# Patient Record
Sex: FEMALE | Race: WHITE | Employment: OTHER | URBAN - METROPOLITAN AREA
[De-identification: names, ages, dates, MRNs, and addresses within clinical notes are randomized per-mention and may not be internally consistent; named-entity substitution may affect disease eponyms.]

---

## 2022-11-18 ENCOUNTER — ANESTHESIA (OUTPATIENT)
Dept: ENDOSCOPY | Age: 77
End: 2022-11-18
Payer: MEDICARE

## 2022-11-18 ENCOUNTER — ANESTHESIA EVENT (OUTPATIENT)
Dept: ENDOSCOPY | Age: 77
End: 2022-11-18
Payer: MEDICARE

## 2022-11-18 ENCOUNTER — HOSPITAL ENCOUNTER (OUTPATIENT)
Age: 77
Setting detail: OUTPATIENT SURGERY
Discharge: HOME | End: 2022-11-18
Attending: INTERNAL MEDICINE | Admitting: INTERNAL MEDICINE
Payer: MEDICARE

## 2022-11-18 VITALS
RESPIRATION RATE: 20 BRPM | DIASTOLIC BLOOD PRESSURE: 68 MMHG | HEIGHT: 63 IN | HEART RATE: 83 BPM | BODY MASS INDEX: 18.75 KG/M2 | OXYGEN SATURATION: 99 % | WEIGHT: 105.82 LBS | TEMPERATURE: 98.8 F | SYSTOLIC BLOOD PRESSURE: 130 MMHG

## 2022-11-18 LAB
H PYLORI FROM TISSUE: NEGATIVE
KIT LOT NO., HCLOLOT: NORMAL
NEGATIVE CONTROL: NEGATIVE
POSITIVE CONTROL: POSITIVE

## 2022-11-18 PROCEDURE — 74011000258 HC RX REV CODE- 258: Performed by: NURSE ANESTHETIST, CERTIFIED REGISTERED

## 2022-11-18 PROCEDURE — 74011000250 HC RX REV CODE- 250: Performed by: NURSE ANESTHETIST, CERTIFIED REGISTERED

## 2022-11-18 PROCEDURE — 76060000031 HC ANESTHESIA FIRST 0.5 HR: Performed by: INTERNAL MEDICINE

## 2022-11-18 PROCEDURE — 2709999900 HC NON-CHARGEABLE SUPPLY: Performed by: INTERNAL MEDICINE

## 2022-11-18 PROCEDURE — 76040000019: Performed by: INTERNAL MEDICINE

## 2022-11-18 PROCEDURE — 74011250636 HC RX REV CODE- 250/636: Performed by: NURSE ANESTHETIST, CERTIFIED REGISTERED

## 2022-11-18 PROCEDURE — 77030021593 HC FCPS BIOP ENDOSC BSC -A: Performed by: INTERNAL MEDICINE

## 2022-11-18 PROCEDURE — 88305 TISSUE EXAM BY PATHOLOGIST: CPT

## 2022-11-18 PROCEDURE — 87077 CULTURE AEROBIC IDENTIFY: CPT | Performed by: INTERNAL MEDICINE

## 2022-11-18 RX ORDER — ATROPINE SULFATE 0.1 MG/ML
0.5 INJECTION INTRAVENOUS
Status: DISCONTINUED | OUTPATIENT
Start: 2022-11-18 | End: 2022-11-18 | Stop reason: HOSPADM

## 2022-11-18 RX ORDER — LORAZEPAM 0.5 MG/1
0.5 TABLET ORAL
COMMUNITY

## 2022-11-18 RX ORDER — FENTANYL CITRATE 50 UG/ML
100 INJECTION, SOLUTION INTRAMUSCULAR; INTRAVENOUS
Status: DISCONTINUED | OUTPATIENT
Start: 2022-11-18 | End: 2022-11-18 | Stop reason: HOSPADM

## 2022-11-18 RX ORDER — LIDOCAINE HYDROCHLORIDE 20 MG/ML
INJECTION, SOLUTION EPIDURAL; INFILTRATION; INTRACAUDAL; PERINEURAL AS NEEDED
Status: DISCONTINUED | OUTPATIENT
Start: 2022-11-18 | End: 2022-11-18 | Stop reason: HOSPADM

## 2022-11-18 RX ORDER — EPINEPHRINE 0.1 MG/ML
1 INJECTION INTRACARDIAC; INTRAVENOUS
Status: DISCONTINUED | OUTPATIENT
Start: 2022-11-18 | End: 2022-11-18 | Stop reason: HOSPADM

## 2022-11-18 RX ORDER — FLUMAZENIL 0.1 MG/ML
0.2 INJECTION INTRAVENOUS
Status: DISCONTINUED | OUTPATIENT
Start: 2022-11-18 | End: 2022-11-18 | Stop reason: HOSPADM

## 2022-11-18 RX ORDER — MIDAZOLAM HYDROCHLORIDE 1 MG/ML
.25-5 INJECTION, SOLUTION INTRAMUSCULAR; INTRAVENOUS
Status: DISCONTINUED | OUTPATIENT
Start: 2022-11-18 | End: 2022-11-18 | Stop reason: HOSPADM

## 2022-11-18 RX ORDER — DEXTROMETHORPHAN/PSEUDOEPHED 2.5-7.5/.8
1.2 DROPS ORAL
Status: DISCONTINUED | OUTPATIENT
Start: 2022-11-18 | End: 2022-11-18 | Stop reason: HOSPADM

## 2022-11-18 RX ORDER — NALOXONE HYDROCHLORIDE 0.4 MG/ML
0.4 INJECTION, SOLUTION INTRAMUSCULAR; INTRAVENOUS; SUBCUTANEOUS
Status: DISCONTINUED | OUTPATIENT
Start: 2022-11-18 | End: 2022-11-18 | Stop reason: HOSPADM

## 2022-11-18 RX ORDER — SODIUM CHLORIDE 9 MG/ML
50 INJECTION, SOLUTION INTRAVENOUS CONTINUOUS
Status: DISCONTINUED | OUTPATIENT
Start: 2022-11-18 | End: 2022-11-18 | Stop reason: HOSPADM

## 2022-11-18 RX ORDER — PROPOFOL 10 MG/ML
INJECTION, EMULSION INTRAVENOUS
Status: DISCONTINUED | OUTPATIENT
Start: 2022-11-18 | End: 2022-11-18 | Stop reason: HOSPADM

## 2022-11-18 RX ORDER — SODIUM CHLORIDE 9 MG/ML
INJECTION, SOLUTION INTRAVENOUS
Status: DISCONTINUED | OUTPATIENT
Start: 2022-11-18 | End: 2022-11-18 | Stop reason: HOSPADM

## 2022-11-18 RX ADMIN — PROPOFOL 100 MCG/KG/MIN: 10 INJECTION, EMULSION INTRAVENOUS at 12:40

## 2022-11-18 RX ADMIN — SODIUM CHLORIDE: 9 INJECTION, SOLUTION INTRAVENOUS at 12:34

## 2022-11-18 RX ADMIN — LIDOCAINE HYDROCHLORIDE 40 MG: 20 INJECTION, SOLUTION EPIDURAL; INFILTRATION; INTRACAUDAL; PERINEURAL at 12:40

## 2022-11-18 NOTE — PERIOP NOTES
1240  Timeout performed. Anesthesia staff at patient's bedside administering anesthesia and monitoring patients vital signs throughout procedure. See anesthesia note. Post procedure, report received from Zuleyka SAUCEDO. 36  Endoscope was pre-cleaned at bedside immediately following procedure by endo Ginny mabry. 1305  Patient tolerated procedure. Abdomen soft and patient arousable and voices no complaints. Patient transported to endoscopy recovery area. Report given to post procedure RNKimber.

## 2022-11-18 NOTE — PROGRESS NOTES
Endoscopy discharge instructions have been reviewed and given to patient. The patient verbalized understanding and acceptance of instructions. Dr. Kristin Teixeira discussed with spouse procedure findings and next steps.

## 2022-11-18 NOTE — PROCEDURES
Italo Wang M.D. 2022    Esophagogastroduodenoscopy (EGD) Colonoscopy Procedure Note  Sherman Gloria  : 1945  [address] [place] [address] Record Number: [id number]      Indications:    Diarrhea, abnormal weight loss  Referring Physician:  Royer Mccarthy MD  Anesthesia/Sedation: see nursing notes  Endoscopist:  Dr. Madelyn King  Assistants: None  Permit:  The indications, risks, benefits and alternatives were reviewed with the patient or their decision maker who was provided an opportunity to ask questions and all questions were answered. The specific risks of esophagogastroduodenoscopy with conscious sedation were reviewed, including but not limited to anesthetic complication, bleeding, adverse drug reaction, missed lesion, infection, IV site reactions, and intestinal perforation which would lead to the need for surgical repair. Alternatives to EGD including radiographic imaging, observation without testing, or laboratory testing were reviewed as well as the limitations of those alternatives discussed. After considering the options and having all their questions answered, the patient or their decision maker provided both verbal and written consent to proceed. Procedure in Detail:  After obtaining informed consent, positioning of the patient in the left lateral decubitus position, and conduction of a pre-procedure pause or \"time out\" the endoscope was introduced into the mouth and advanced to the duodenum. A careful inspection was made, and findings or interventions are described below.     Findings:   Esophagus:normal  Stomach: Shallow prepyloric erosions; stomach otherwise unremarkable  Duodenum/jejunum:  Unremarkable through the third portion    Complications/estimated blood loss: none    Therapies:  biopsy of stomach antrum    Specimens:  Biopsies    Implants: None           Endoscopist:   Kirit Milan  Assistants: None  Complications:  None  Estimate Blood Loss:  None    Colonoscopy is to follow    Permit:  The indications, risks, benefits and alternatives were reviewed with the patient or their decision maker who was provided an opportunity to ask questions and all questions were answered. The specific risks of colonoscopy with conscious sedation were reviewed, including but not limited to anesthetic complication, bleeding, adverse drug reaction, missed lesion, infection, IV site reactions, and intestinal perforation which would lead to the need for surgical repair. Alternatives to colonoscopy including radiographic imaging, observation without testing, or laboratory testing were reviewed including the limitations of those alternatives. After considering the options and having all their questions answered, the patient or their decision maker provided both verbal and written consent to proceed. Procedure in Detail:  After obtaining informed consent, positioning of the patient in the left lateral decubitus position, and conduction of a pre-procedure pause or \"time out\" the endoscope was introduced into the anus and advanced to 30 cm of terminal ileum. The quality of the colonic preparation was adequate. A careful inspection was made as the colonoscope was withdrawn, findings and interventions are described below. Appendiceal orifice photographed    Findings:   no mucosal lesion appreciated      - Diverticulosis    Specimens:    none    Implants: None    Complications:   None; patient tolerated the procedure well. Estimated blood loss: none    Impression:  Colonic diverticulosis, minor antral gastritis    Recommendations:     - Await pathology. She is asked to  samples of pancreatic enzyme replacement to use 3 times a day while awaiting pathology results to assess whether or not diarrheal bowel movements are improved with this treatment.     In the absence of other clinical findings routine follow-up colonoscopy not recommended    Thank you for entrusting me with this patient's care. Please do not hesitate to contact me with any questions or if I can be of assistance with any of your other patients' GI needs.     Signed By: Aneesh Champion MD                        November 18, 2022

## 2022-11-18 NOTE — ANESTHESIA POSTPROCEDURE EVALUATION
Procedure(s):  COLONOSCOPY  ESOPHAGOGASTRODUODENOSCOPY (EGD)  ESOPHAGOGASTRODUODENAL (EGD) BIOPSY  COLON BIOPSY. MAC    Anesthesia Post Evaluation        Patient location during evaluation: bedside  Level of consciousness: awake  Pain management: satisfactory to patient  Airway patency: patent  Anesthetic complications: no  Cardiovascular status: acceptable  Respiratory status: acceptable  Hydration status: acceptable        INITIAL Post-op Vital signs:   Vitals Value Taken Time   /44 11/18/22 1311   Temp     Pulse 87 11/18/22 1312   Resp 30 11/18/22 1312   SpO2 100 % 11/18/22 1312   Vitals shown include unvalidated device data.

## 2022-11-18 NOTE — PROGRESS NOTES
Mayra Roberts  1945  [id number]    Situation:  Verbal report received from: Jatinder Greene RN   Procedure: Procedure(s) with comments:  COLONOSCOPY  ESOPHAGOGASTRODUODENOSCOPY (EGD)  ESOPHAGOGASTRODUODENAL (EGD) BIOPSY - + h pylori  COLON BIOPSY    Background:    Preoperative diagnosis: NOT PROVIDED  Postoperative diagnosis: m    :  Dr. Marian Cuba  Assistant(s): Endoscopy Technician-1: Marylou Suggs  Endoscopy RN-1: Maryellen Randolph RN    Specimens:   ID Type Source Tests Collected by Time Destination   1 : pyloric biopsy Preservative   Conrado Byers MD 11/18/2022 1245 Pathology   2 : random colon biopsy Preservative   Conrado Byers MD 11/18/2022 1256 Pathology     H. Pylori  yes    Assessment:  Intra-procedure medications     Anesthesia gave intra-procedure sedation and medications, see anesthesia flow sheet yes    Intravenous fluids: NS@ KVO     Vital signs stable   yes    Abdominal assessment: round and soft   yes    Recommendation:  Discharge patient per MD order  yes.   Return to floor  outpatient   Family or Friend   spouse  Permission to share finding with family or friend yes

## 2022-11-18 NOTE — ANESTHESIA PREPROCEDURE EVALUATION
Relevant Problems   CARDIOVASCULAR   (+) Coronary atherosclerosis of native coronary artery   (+) HTN (hypertension)       Anesthetic History   No history of anesthetic complications            Review of Systems / Medical History  Patient summary reviewed, nursing notes reviewed and pertinent labs reviewed    Pulmonary  Within defined limits                 Neuro/Psych   Within defined limits           Cardiovascular  Within defined limits  Hypertension          CAD (stable)         GI/Hepatic/Renal  Within defined limits              Endo/Other  Within defined limits      Arthritis     Other Findings              Physical Exam    Airway  Mallampati: II  TM Distance: 4 - 6 cm  Neck ROM: normal range of motion   Mouth opening: Normal     Cardiovascular    Rhythm: regular  Rate: normal         Dental  No notable dental hx       Pulmonary  Breath sounds clear to auscultation               Abdominal         Other Findings            Anesthetic Plan    ASA: 3  Anesthesia type: MAC            Anesthetic plan and risks discussed with: Patient

## 2022-11-18 NOTE — DISCHARGE INSTRUCTIONS
Annita Bhagat  [id number]  1945    DISCHARGE INSTRUCTIONS    Results:  Mild diverticulosis, gastritis seen.  samples of pancreatic enzymes from Dr. Lakshmi Mcneil office to be used with each meal; also schedule additional testing through Dr. Lakshmi Mcneil office. Call Monday or Tuesday for those samples and additional testing recommendation  Discomfort:  Redness at IV site- apply warm compress to area; if redness or soreness persist- contact your physician. There may be a slight amount of blood passed from the rectum. Gaseous discomfort - walking, belching will help relieve any discomfort. You may not operate a vehicle for 12 hours. You may not engage in an occupation involving machinery or appliances for rest of today. You may not drink alcoholic beverages for at least 12 hours. Avoid making any critical decisions for at least 24 hours. DIET:   High fiber diet. Medications:                Resume usual medications today   ACTIVITY:  You may resume your normal daily activities it is recommended that you spend the remainder of the day resting -  avoid any strenuous activity. CALL M.D. ANY SIGN OF:   Increasing pain, nausea, vomiting  Abdominal distension (swelling)  New increased bleeding (oral or rectal)  Fever (chills)  Pain in chest area  Bloody discharge from nose or mouth  Shortness of breath     Follow-up Instructions:  Call Dr. Kaur Garsia if you have any questions or problems.           DISCHARGE SUMMARY from Nurse    The following personal items collected during your admission are returned to you:   Dental Appliance: Dental Appliances: Lowers, Uppers, At home  Vision: Visual Aid: None  Hearing Aid:    Jewelry:    Clothing:    Other Valuables:    Valuables sent to safe:

## 2022-11-18 NOTE — H&P
Patient Name: Andriette Form  Gender: Female   (age): 1945 (77)       Referring Physician:    Hanna Branch. Shahrzad Gastelum   [address], [place], [place]. [address]  [phone] (phone)  [phone] (fax)     Chief Complaint:    diarrhea, weight loss     History of Present Illness: For months has had limited oral intake because of lack of appetite. She does not have vomiting, fever, visible blood loss; she will have abdominal cramps but no tenderness. She typically has 2 loose daily not easy to control bowel movements; typically has one when she gets up in the morning and a second bowel movement in the evening. She has lost approximately 18 pounds so far this year. Counseled for smoking cessation. Her Cologuard test was positive; CT scanning without diagnosis. Past Medical History  Medical Conditions:   High blood pressure  High cholesterol  Ischemic Heart Disease  S/P Stents x3  Surgical Procedures:   Gallbladder surgery,   Hysterectomy,   Other major surgeries:, 5 caths  dental surgery  Dx Studies:   Abdominal U/S,   CAT Scan  Colonoscopy, 2017  Medications:   Adult Low Dose Aspirin 81 mg Take 1 tablet by mouth once a day as directed  Coreg 12.5 mg Take 1 tablet by mouth twice a day as directed  hydrocodone-acetaminophen 5-325 mg Take 1 tablet by mouth once a day as needed for pain  rosuvastatin 5 mg Take 1/2 tablet by mouth once a day as directed  sertraline 50 mg Take 1 tablet by mouth once a day as directed  Allergies: Ace Inhibitors  Dilaudid  heparin  Immunizations:   Influenza vaccination, 2021  Influenza vaccination, 2020  pneumococcal polysaccharide PPV23, 2018  Flu vaccine  Social History  Alcohol:   None  Tobacco:   Current every day smoker  Cigarettes 10 cigarettes a day. Drugs:   None  Exercise:   Walking/ Running 7 times a week. Other: _summer_swimming_________ 7 times a week. Exercise 3 or more times a week. Caffeine:   Daily.   Coffee or Tea # of cups per day:1. Occupation:    retired teacher     Family History   No history of Celiac sprue, Colon Cancer, Colon Polyps, Esophageal Cancer, GI Cancers, Inflammatory bowel disease (Crohn's or Ulcerative Colitis), Liver disease, Pancreatic Cancer, Stomach Cancer  Review of Systems:  Cardiovascular: Denies chest pain, irregular heart beat, palpitations, peripheral edema, syncope, Sweats. Constitutional: Denies fatigue, fever, loss of appetite, weight gain, weight loss. ENMT: Denies nose bleeds, sore throat, hearing loss. Endocrine: Denies excessive thirst, heat intolerance. Eyes: Denies loss of vision. Gastrointestinal: Presents suffers from diarrhea. Denies abdominal pain, abdominal swelling, change in bowel habits, constipation, Bloating/gas, heartburn, jaundice, nausea, rectal bleeding, stomach cramps, vomiting, dysphagia, rectal pain, Stool incontinence, hematemesis. Genitourinary: Denies dark urine, dysuria, frequent urination, hematuria, incontinence. Hematologic/Lymphatic: Denies easy bruising, prolonged bleeding. Integumentary: Denies itching, rashes, sun sensitivity. Musculoskeletal: Denies arthritis, back pain, gout, joint pain, muscle weakness, stiffness. Neurological: Denies dizziness, fainting, frequent headaches, memory loss. Psychiatric: Denies anxiety, depression, difficulty sleeping, hallucinations, nervousness, panic attacks, paranoia. Respiratory: Denies cough, dyspnea, wheezing. Vital Signs:    See Rn notes  Physical Exam:  Constitutional:  Appearance: No distress, appears comfortable. Skin:  Inspection: No rash, no jaundice. Head/face: Inspection: Normacephalic, atraumatic. Eyes:  Conjunctivae/lids: Normal.  ENMT:  External: Normal.  Neck:  Neck: Normal appearance, trachea midline. Respiratory:  Effort: Normal respiratory effort, comfortable, speaks in complete sentences.   Auscultation: normal breath sounds, no rubs, wheezes or rhonchi. Cardiovascular: Auscultation: normal, S1 and S2,no gallops,no rubs or murmurs. Gastrointestinal/Abdomen:  Abdomen: non-distended, nontender. Liver/Spleen: normal,normal size,Liver size and consistency normal, spleen is non-palpable. Musculoskeletal:  Gait/station: normal.  Muscles: normal strength and tone, no atrophy or abnormal movements. Psychiatric:  Judgment/insight: Normal,normal judgement, normal insight. Mood and affect: Normal mood, affect full,no evidence of depression, anxiety or agitation. Impressions:   Diarrhea  Weight loss  Abnormal findings in stool    Plan:   I've discussed EGD, colonoscopy possible biopsy, polypectomy, cautery, injection, alternatives, complications including but not limited to pain, cardiopulmonary event, bleeding, perforation requiring additional blood transfusion or operative repair; all questions answered.

## 2023-06-06 ENCOUNTER — TELEPHONE (OUTPATIENT)
Age: 78
End: 2023-06-06

## 2023-06-06 NOTE — TELEPHONE ENCOUNTER
6/6/2023 @3:05pm-R/T phone call to patient regarding an appt for surgery w/ [facility] [place]. L/M on VM to have patient contact the office at [phone]. ww

## 2024-05-21 ENCOUNTER — TRANSCRIBE ORDERS (OUTPATIENT)
Facility: HOSPITAL | Age: 79
End: 2024-05-21

## 2024-05-21 DIAGNOSIS — Z98.61 POSTSURGICAL PERCUTANEOUS TRANSLUMINAL CORONARY ANGIOPLASTY (PTCA) STATUS: Primary | ICD-10-CM

## 2024-07-15 DIAGNOSIS — Z95.5 STENTED CORONARY ARTERY: Primary | ICD-10-CM

## (undated) DEVICE — 3M™ CUROS™ DISINFECTING CAP FOR NEEDLELESS CONNECTORS 270/CARTON 20 CARTONS/CASE CFF1-270: Brand: CUROS™

## (undated) DEVICE — ADULT SPO2 SENSOR,REMANUFACTURED,REPROCESSED DEVICE FOR SINGLE USE; REPROCESSED BY COVIDIEN LLC: Brand: NELLCOR

## (undated) DEVICE — KIT COLON W/ 1.1OZ LUB AND 2 END

## (undated) DEVICE — CATH IV AUTOGRD BC BLU 22GA 25 -- INSYTE

## (undated) DEVICE — CANN NASAL O2 CAPNOGRAPHY AD -- FILTERLINE

## (undated) DEVICE — BAG BELONG PT PERS CLEAR HANDL

## (undated) DEVICE — SOLIDIFIER FLD 2OZ 1500CC N DISINF IN BTL DISP SAFESORB

## (undated) DEVICE — ELECTRODE,RADIOTRANSLUCENT,FOAM,3PK: Brand: MEDLINE

## (undated) DEVICE — Device

## (undated) DEVICE — SIMPLICITY FLUFF UNDERPAD 23X36, MODERATE: Brand: SIMPLICITY

## (undated) DEVICE — BITEBLOCK ENDOSCP 60FR MAXI WHT POLYETH STURDY W/ VELC WVN

## (undated) DEVICE — SOLIDIFIER MEDC 1200ML -- CONVERT TO 356117

## (undated) DEVICE — 1200 GUARD II KIT W/5MM TUBE W/O VAC TUBE: Brand: GUARDIAN

## (undated) DEVICE — SET GRAV CK VLV NEEDLESS ST 3 GANGED 4WAY STPCOCK HI FLO 10

## (undated) DEVICE — FCPS RAD JAW 4LC 240CM W/NDL -- BX/40

## (undated) DEVICE — CUFF RMFG BP INF SZ 11 DISP -- LAWSON OEM ITEM 238915

## (undated) DEVICE — BAG SPEC BIOHZRD 10 X 10 IN --

## (undated) DEVICE — CONTAINER SPEC 20 ML LID NEUT BUFF FORMALIN 10 % POLYPR STS